# Patient Record
Sex: FEMALE | Race: WHITE | Employment: FULL TIME | URBAN - METROPOLITAN AREA
[De-identification: names, ages, dates, MRNs, and addresses within clinical notes are randomized per-mention and may not be internally consistent; named-entity substitution may affect disease eponyms.]

---

## 2017-12-20 ENCOUNTER — HOSPITAL ENCOUNTER (OUTPATIENT)
Age: 27
Discharge: HOME | End: 2017-12-20
Attending: PEDIATRICS
Payer: COMMERCIAL

## 2017-12-20 VITALS
HEIGHT: 69 IN | RESPIRATION RATE: 16 BRPM | WEIGHT: 140 LBS | BODY MASS INDEX: 20.73 KG/M2 | OXYGEN SATURATION: 99 % | SYSTOLIC BLOOD PRESSURE: 111 MMHG | HEART RATE: 80 BPM | TEMPERATURE: 99 F | DIASTOLIC BLOOD PRESSURE: 76 MMHG

## 2017-12-20 DIAGNOSIS — B34.9 VIRAL SYNDROME: Primary | ICD-10-CM

## 2017-12-20 DIAGNOSIS — N30.00 ACUTE CYSTITIS WITHOUT HEMATURIA: ICD-10-CM

## 2017-12-20 PROCEDURE — 99204 OFFICE O/P NEW MOD 45 MIN: CPT

## 2017-12-20 PROCEDURE — 81002 URINALYSIS NONAUTO W/O SCOPE: CPT

## 2017-12-20 PROCEDURE — 87430 STREP A AG IA: CPT

## 2017-12-20 PROCEDURE — 99203 OFFICE O/P NEW LOW 30 MIN: CPT

## 2017-12-20 PROCEDURE — 81025 URINE PREGNANCY TEST: CPT

## 2017-12-20 RX ORDER — SULFAMETHOXAZOLE AND TRIMETHOPRIM 800; 160 MG/1; MG/1
1 TABLET ORAL 2 TIMES DAILY
Qty: 6 TABLET | Refills: 0 | Status: SHIPPED | OUTPATIENT
Start: 2017-12-20 | End: 2017-12-23

## 2017-12-20 NOTE — ED PROVIDER NOTES
Patient presents with:  Cough/URI  Urinary Symptoms (urologic)      HPI:     Marciano Moise is a 32year old female who presents for evaluation of a chief complaint of upper respiratory symptoms since yesterday.   Patient notes she started with a sore Collection Time: 12/20/17  9:50 AM   Result Value Ref Range   POCT Rapid Strep Negative Negative   -[facility] POCT PREGNANCY URINE   Collection Time: 12/20/17  9:53 AM   Result Value Ref Range   POCT Urine Pregnancy Negative Negative   -[facility] POCT URINALYSIS DIP

## 2017-12-20 NOTE — ED INITIAL ASSESSMENT (HPI)
Feels she has athe flu for 24 hours better with dayquil also think she has UTI with burning and freq since last night.

## 2022-10-06 ENCOUNTER — HOSPITAL ENCOUNTER (OUTPATIENT)
Age: 32
Discharge: HOME | End: 2022-10-06
Payer: COMMERCIAL

## 2022-10-06 VITALS
RESPIRATION RATE: 16 BRPM | HEIGHT: 69 IN | SYSTOLIC BLOOD PRESSURE: 125 MMHG | TEMPERATURE: 97 F | OXYGEN SATURATION: 100 % | BODY MASS INDEX: 21.48 KG/M2 | DIASTOLIC BLOOD PRESSURE: 73 MMHG | HEART RATE: 72 BPM | WEIGHT: 145 LBS

## 2022-10-06 DIAGNOSIS — R21 RASH: Primary | ICD-10-CM

## 2022-10-06 PROCEDURE — 99213 OFFICE O/P EST LOW 20 MIN: CPT | Performed by: PHYSICIAN ASSISTANT

## 2022-10-06 RX ORDER — PREDNISONE 10 MG/1
TABLET ORAL
Qty: 32 TABLET | Refills: 0 | Status: SHIPPED | OUTPATIENT
Start: 2022-10-06 | End: 2022-10-17

## 2022-10-06 RX ORDER — DOXYCYCLINE HYCLATE 100 MG/1
100 CAPSULE ORAL 2 TIMES DAILY
Qty: 20 CAPSULE | Refills: 0 | Status: SHIPPED | OUTPATIENT
Start: 2022-10-06 | End: 2022-10-16

## 2022-10-06 NOTE — ED INITIAL ASSESSMENT (HPI)
Patient presents a&o 4/4 c/o itchy rash on RUE near elbow. States woke up with bite marks on sept 26th. States was given a steroid cream which has not given relief. States redness is spreading with itchiness.  Denies fever/chills

## (undated) NOTE — LETTER
[id number]  [facility]  Dept: [phone]  Dept Fax: [phone]  Loc: [phone]      December 20, 2017    Patient: Nadia Qureshi   Date of Visit: 12/20/2017       To Whom It May Concern: